# Patient Record
Sex: MALE | URBAN - METROPOLITAN AREA
[De-identification: names, ages, dates, MRNs, and addresses within clinical notes are randomized per-mention and may not be internally consistent; named-entity substitution may affect disease eponyms.]

---

## 2019-11-12 ENCOUNTER — OFFICE VISIT (OUTPATIENT)
Dept: FAMILY MEDICINE CLINIC | Facility: CLINIC | Age: 39
End: 2019-11-12
Payer: COMMERCIAL

## 2019-11-12 VITALS
SYSTOLIC BLOOD PRESSURE: 122 MMHG | OXYGEN SATURATION: 97 % | TEMPERATURE: 98 F | HEIGHT: 70 IN | RESPIRATION RATE: 24 BRPM | DIASTOLIC BLOOD PRESSURE: 82 MMHG | WEIGHT: 247 LBS | HEART RATE: 90 BPM | BODY MASS INDEX: 35.36 KG/M2

## 2019-11-12 DIAGNOSIS — H72.92 OTITIS MEDIA WITH RUPTURE OF TYMPANIC MEMBRANE, LEFT: Primary | ICD-10-CM

## 2019-11-12 DIAGNOSIS — H66.92 OTITIS MEDIA WITH RUPTURE OF TYMPANIC MEMBRANE, LEFT: Primary | ICD-10-CM

## 2019-11-12 PROCEDURE — 99203 OFFICE O/P NEW LOW 30 MIN: CPT | Performed by: NURSE PRACTITIONER

## 2019-11-12 RX ORDER — CEFDINIR 300 MG/1
300 CAPSULE ORAL 2 TIMES DAILY
Qty: 14 CAPSULE | Refills: 0 | Status: SHIPPED | OUTPATIENT
Start: 2019-11-12 | End: 2019-11-19

## 2019-11-12 RX ORDER — CIPROFLOXACIN AND DEXAMETHASONE 3; 1 MG/ML; MG/ML
4 SUSPENSION/ DROPS AURICULAR (OTIC) 2 TIMES DAILY
Qty: 7.5 ML | Refills: 0 | Status: SHIPPED | OUTPATIENT
Start: 2019-11-12 | End: 2019-11-19

## 2019-11-12 NOTE — PATIENT INSTRUCTIONS
· Please start Ciprodex drops to left ear twice daily for 7 days. Keep water out of ear. · Start Cefdinir by mouth twice daily for 7 days with food. Take probiotics at lunch time if stomach issues occur.   · May use Advil or Advil cold and sinus if desired Last Reviewed: 10/1/2017  © 5516-8433 [employer]. [address] [facility] – [place], [address] [place]. All rights reserved. This information is not intended as a substitute for professional medical care.  Always follow your healthcare professional's in

## 2019-11-12 NOTE — PROGRESS NOTES
HPI:   Loy  is a 37year old male who presents with ill symptoms for  2  weeks. Patient reports some congestion and left ear pain, muffled hearing from left ear, mild sinus pressure.  He travels extensively internationally and has been traveling mo -     cefdinir 300 MG Oral Cap; Take 1 capsule (300 mg total) by mouth 2 (two) times daily for 7 days. -     ciprofloxacin-dexamethasone 0.3-0.1 % Otic Suspension; Place 4 drops into the left ear 2 (two) times daily for 7 days. Meds as above.  Info on · Keep a clean cotton ball in the ear canal to absorb drainage. Change the cotton often, when it becomes soiled with fluid drainage. Don’t let water get into the ear.  Don’t put any medicine drops into the ear unless your healthcare provider tells you to do